# Patient Record
Sex: FEMALE | NOT HISPANIC OR LATINO | Employment: UNEMPLOYED | URBAN - METROPOLITAN AREA
[De-identification: names, ages, dates, MRNs, and addresses within clinical notes are randomized per-mention and may not be internally consistent; named-entity substitution may affect disease eponyms.]

---

## 2020-07-20 PROBLEM — F31.9 BIPOLAR 1 DISORDER (HCC): Status: ACTIVE | Noted: 2020-07-20

## 2021-04-28 ENCOUNTER — APPOINTMENT (OUTPATIENT)
Dept: GASTROENTEROLOGY | Age: 36
End: 2021-04-28
Attending: INTERNAL MEDICINE

## 2021-05-04 DIAGNOSIS — Z01.812 PRE-PROCEDURAL LABORATORY EXAMINATION: Primary | ICD-10-CM

## 2021-05-16 ENCOUNTER — LAB SERVICES (OUTPATIENT)
Dept: LAB | Age: 36
End: 2021-05-16

## 2021-05-16 DIAGNOSIS — Z01.812 PRE-PROCEDURAL LABORATORY EXAMINATION: ICD-10-CM

## 2021-05-16 LAB
SARS-COV-2 RNA RESP QL NAA+PROBE: NOT DETECTED
SERVICE CMNT-IMP: NORMAL
SERVICE CMNT-IMP: NORMAL

## 2021-05-16 PROCEDURE — U0003 INFECTIOUS AGENT DETECTION BY NUCLEIC ACID (DNA OR RNA); SEVERE ACUTE RESPIRATORY SYNDROME CORONAVIRUS 2 (SARS-COV-2) (CORONAVIRUS DISEASE [COVID-19]), AMPLIFIED PROBE TECHNIQUE, MAKING USE OF HIGH THROUGHPUT TECHNOLOGIES AS DESCRIBED BY CMS-2020-01-R: HCPCS | Performed by: INTERNAL MEDICINE

## 2021-05-16 PROCEDURE — U0005 INFEC AGEN DETEC AMPLI PROBE: HCPCS | Performed by: INTERNAL MEDICINE

## 2021-05-19 ENCOUNTER — ANESTHESIA (OUTPATIENT)
Dept: GASTROENTEROLOGY | Age: 36
End: 2021-05-19

## 2021-05-19 ENCOUNTER — ANESTHESIA EVENT (OUTPATIENT)
Dept: GASTROENTEROLOGY | Age: 36
End: 2021-05-19

## 2021-05-19 ENCOUNTER — HOSPITAL ENCOUNTER (OUTPATIENT)
Dept: GENERAL RADIOLOGY | Age: 36
Discharge: HOME | End: 2021-05-19
Attending: INTERNAL MEDICINE

## 2021-05-19 ENCOUNTER — HOSPITAL ENCOUNTER (OUTPATIENT)
Dept: GASTROENTEROLOGY | Age: 36
Discharge: HOME | End: 2021-05-19
Attending: INTERNAL MEDICINE

## 2021-05-19 VITALS
RESPIRATION RATE: 12 BRPM | OXYGEN SATURATION: 100 % | BODY MASS INDEX: 46.91 KG/M2 | DIASTOLIC BLOOD PRESSURE: 79 MMHG | HEART RATE: 69 BPM | TEMPERATURE: 97.5 F | WEIGHT: 264.77 LBS | SYSTOLIC BLOOD PRESSURE: 141 MMHG | HEIGHT: 63 IN

## 2021-05-19 DIAGNOSIS — R11.2 N&V (NAUSEA AND VOMITING): ICD-10-CM

## 2021-05-19 DIAGNOSIS — K83.8 BILIARY SLUDGE: ICD-10-CM

## 2021-05-19 DIAGNOSIS — R10.13 EPIGASTRIC PAIN: ICD-10-CM

## 2021-05-19 DIAGNOSIS — R52 PAIN: ICD-10-CM

## 2021-05-19 LAB
B-HCG UR QL: NEGATIVE
INTERNAL PROCEDURAL CONTROLS ACCEPTABLE: YES

## 2021-05-19 PROCEDURE — 81025 URINE PREGNANCY TEST: CPT | Performed by: INTERNAL MEDICINE

## 2021-05-19 PROCEDURE — 10002807 HB RX 258: Performed by: INTERNAL MEDICINE

## 2021-05-19 PROCEDURE — 10004451 HB PACU RECOVERY 1ST 30 MINUTES

## 2021-05-19 PROCEDURE — 13000008 HB ANESTHESIA MAC OUTSIDE OR

## 2021-05-19 PROCEDURE — 10002805 HB CONTRAST AGENT: Performed by: INTERNAL MEDICINE

## 2021-05-19 PROCEDURE — 13000001 HB PHASE II RECOVERY EA 30 MINUTES

## 2021-05-19 PROCEDURE — 88305 TISSUE EXAM BY PATHOLOGIST: CPT | Performed by: INTERNAL MEDICINE

## 2021-05-19 PROCEDURE — C1769 GUIDE WIRE: HCPCS

## 2021-05-19 PROCEDURE — 10002800 HB RX 250 W HCPCS

## 2021-05-19 PROCEDURE — 10006023 HB SUPPLY 272

## 2021-05-19 PROCEDURE — 13000028 HB GI MAJOR COMPLEX CASE S/U + 1ST 15 MIN

## 2021-05-19 PROCEDURE — 74330 X-RAY BILE/PANC ENDOSCOPY: CPT

## 2021-05-19 RX ORDER — MIDAZOLAM HYDROCHLORIDE 1 MG/ML
INJECTION, SOLUTION INTRAMUSCULAR; INTRAVENOUS PRN
Status: DISCONTINUED | OUTPATIENT
Start: 2021-05-19 | End: 2021-05-19

## 2021-05-19 RX ORDER — PREGABALIN 75 MG/1
75 CAPSULE ORAL
COMMUNITY
Start: 2020-05-20

## 2021-05-19 RX ORDER — ALBUTEROL SULFATE 90 UG/1
AEROSOL, METERED RESPIRATORY (INHALATION)
COMMUNITY
Start: 2021-04-19

## 2021-05-19 RX ORDER — FREMANEZUMAB-VFRM 225 MG/1.5ML
INJECTION SUBCUTANEOUS
COMMUNITY
Start: 2021-03-17

## 2021-05-19 RX ORDER — PANTOPRAZOLE SODIUM 40 MG/1
40 TABLET, DELAYED RELEASE ORAL
COMMUNITY
Start: 2017-09-21

## 2021-05-19 RX ORDER — APIXABAN 5 MG/1
TABLET, FILM COATED ORAL
COMMUNITY
Start: 2021-05-06

## 2021-05-19 RX ORDER — LOSARTAN POTASSIUM 100 MG/1
100 TABLET ORAL
COMMUNITY
Start: 2021-03-17

## 2021-05-19 RX ORDER — TIZANIDINE 4 MG/1
TABLET ORAL
COMMUNITY
Start: 2021-05-06

## 2021-05-19 RX ORDER — SODIUM CHLORIDE, SODIUM LACTATE, POTASSIUM CHLORIDE, CALCIUM CHLORIDE 600; 310; 30; 20 MG/100ML; MG/100ML; MG/100ML; MG/100ML
INJECTION, SOLUTION INTRAVENOUS CONTINUOUS
Status: DISCONTINUED | OUTPATIENT
Start: 2021-05-19 | End: 2021-05-21 | Stop reason: HOSPADM

## 2021-05-19 RX ORDER — TRAZODONE HYDROCHLORIDE 100 MG/1
300 TABLET ORAL
COMMUNITY
Start: 2021-03-17

## 2021-05-19 RX ORDER — METOCLOPRAMIDE 10 MG/1
TABLET ORAL
COMMUNITY
Start: 2021-04-05

## 2021-05-19 RX ORDER — PROPOFOL 10 MG/ML
INJECTION, EMULSION INTRAVENOUS PRN
Status: DISCONTINUED | OUTPATIENT
Start: 2021-05-19 | End: 2021-05-19

## 2021-05-19 RX ORDER — ATORVASTATIN CALCIUM 10 MG/1
10 TABLET, FILM COATED ORAL
COMMUNITY
Start: 2021-03-17

## 2021-05-19 RX ADMIN — FENTANYL CITRATE 50 MCG: 50 INJECTION, SOLUTION INTRAMUSCULAR; INTRAVENOUS at 09:35

## 2021-05-19 RX ADMIN — PROPOFOL 100 MG: 10 INJECTION, EMULSION INTRAVENOUS at 09:26

## 2021-05-19 RX ADMIN — MIDAZOLAM HYDROCHLORIDE 2 MG: 1 INJECTION, SOLUTION INTRAMUSCULAR; INTRAVENOUS at 09:23

## 2021-05-19 RX ADMIN — SODIUM CHLORIDE, SODIUM LACTATE, POTASSIUM CHLORIDE, AND CALCIUM CHLORIDE: .6; .31; .03; .02 INJECTION, SOLUTION INTRAVENOUS at 09:03

## 2021-05-19 RX ADMIN — PROPOFOL 50 MG: 10 INJECTION, EMULSION INTRAVENOUS at 09:31

## 2021-05-19 RX ADMIN — PROPOFOL 75 MCG/KG/MIN: 10 INJECTION, EMULSION INTRAVENOUS at 09:26

## 2021-05-19 RX ADMIN — IOHEXOL 50 ML: 300 INJECTION, SOLUTION INTRAVENOUS at 09:56

## 2021-05-19 RX ADMIN — PROPOFOL 50 MG: 10 INJECTION, EMULSION INTRAVENOUS at 09:36

## 2021-05-19 RX ADMIN — FENTANYL CITRATE 50 MCG: 50 INJECTION, SOLUTION INTRAMUSCULAR; INTRAVENOUS at 09:30

## 2021-05-19 ASSESSMENT — PAIN SCALES - GENERAL
PAINLEVEL_OUTOF10: 0
PAINLEVEL_OUTOF10: 0
PAINLEVEL_OUTOF10: 4
PAINLEVEL_OUTOF10: 4

## 2021-05-19 ASSESSMENT — ACTIVITIES OF DAILY LIVING (ADL): HISTORY OF FALLING IN THE LAST YEAR (PRIOR TO ADMISSION): YES

## 2021-05-21 LAB
ASR DISCLAIMER: NORMAL
CASE RPRT: NORMAL
CLINICAL INFO: NORMAL
PATH REPORT.FINAL DX SPEC: NORMAL
PATH REPORT.GROSS SPEC: NORMAL

## 2022-04-20 ENCOUNTER — APPOINTMENT (OUTPATIENT)
Dept: GENERAL RADIOLOGY | Facility: HOSPITAL | Age: 37
End: 2022-04-20
Attending: EMERGENCY MEDICINE
Payer: MEDICAID

## 2022-04-20 ENCOUNTER — HOSPITAL ENCOUNTER (EMERGENCY)
Facility: HOSPITAL | Age: 37
Discharge: HOME | End: 2022-04-20
Attending: EMERGENCY MEDICINE
Payer: MEDICAID

## 2022-04-20 VITALS
HEART RATE: 71 BPM | TEMPERATURE: 97 F | RESPIRATION RATE: 20 BRPM | DIASTOLIC BLOOD PRESSURE: 76 MMHG | SYSTOLIC BLOOD PRESSURE: 137 MMHG | OXYGEN SATURATION: 98 %

## 2022-04-20 DIAGNOSIS — G43.909 MIGRAINE WITHOUT STATUS MIGRAINOSUS, NOT INTRACTABLE, UNSPECIFIED MIGRAINE TYPE: ICD-10-CM

## 2022-04-20 DIAGNOSIS — M79.671 FOOT PAIN, BILATERAL: ICD-10-CM

## 2022-04-20 DIAGNOSIS — M79.672 FOOT PAIN, BILATERAL: ICD-10-CM

## 2022-04-20 DIAGNOSIS — M79.89 FOOT SWELLING: Primary | ICD-10-CM

## 2022-04-20 LAB
ALBUMIN SERPL-MCNC: 3.4 G/DL (ref 3.4–5)
ALBUMIN/GLOB SERPL: 0.9 {RATIO} (ref 1–2)
ALP LIVER SERPL-CCNC: 126 U/L
ALT SERPL-CCNC: 16 U/L
ANION GAP SERPL CALC-SCNC: 6 MMOL/L (ref 0–18)
AST SERPL-CCNC: 12 U/L (ref 15–37)
BASOPHILS # BLD AUTO: 0.05 X10(3) UL (ref 0–0.2)
BASOPHILS NFR BLD AUTO: 0.9 %
BILIRUB SERPL-MCNC: 0.2 MG/DL (ref 0.1–2)
BUN BLD-MCNC: 4 MG/DL (ref 7–18)
CALCIUM BLD-MCNC: 8.8 MG/DL (ref 8.5–10.1)
CHLORIDE SERPL-SCNC: 105 MMOL/L (ref 98–112)
CO2 SERPL-SCNC: 28 MMOL/L (ref 21–32)
CREAT BLD-MCNC: 0.68 MG/DL
EOSINOPHIL # BLD AUTO: 0.09 X10(3) UL (ref 0–0.7)
EOSINOPHIL NFR BLD AUTO: 1.7 %
ERYTHROCYTE [DISTWIDTH] IN BLOOD BY AUTOMATED COUNT: 18.1 %
GLOBULIN PLAS-MCNC: 3.8 G/DL (ref 2.8–4.4)
GLUCOSE BLD-MCNC: 91 MG/DL (ref 70–99)
HCT VFR BLD AUTO: 32.4 %
HGB BLD-MCNC: 10.1 G/DL
IMM GRANULOCYTES # BLD AUTO: 0.02 X10(3) UL (ref 0–1)
IMM GRANULOCYTES NFR BLD: 0.4 %
LYMPHOCYTES # BLD AUTO: 1.75 X10(3) UL (ref 1–4)
LYMPHOCYTES NFR BLD AUTO: 32.1 %
MCH RBC QN AUTO: 26.8 PG (ref 26–34)
MCHC RBC AUTO-ENTMCNC: 31.2 G/DL (ref 31–37)
MCV RBC AUTO: 85.9 FL
MONOCYTES # BLD AUTO: 0.49 X10(3) UL (ref 0.1–1)
MONOCYTES NFR BLD AUTO: 9 %
NEUTROPHILS # BLD AUTO: 3.05 X10 (3) UL (ref 1.5–7.7)
NEUTROPHILS # BLD AUTO: 3.05 X10(3) UL (ref 1.5–7.7)
NEUTROPHILS NFR BLD AUTO: 55.9 %
NT-PROBNP SERPL-MCNC: 256 PG/ML (ref ?–125)
OSMOLALITY SERPL CALC.SUM OF ELEC: 284 MOSM/KG (ref 275–295)
PLATELET # BLD AUTO: 521 10(3)UL (ref 150–450)
POTASSIUM SERPL-SCNC: 3.7 MMOL/L (ref 3.5–5.1)
PROT SERPL-MCNC: 7.2 G/DL (ref 6.4–8.2)
RBC # BLD AUTO: 3.77 X10(6)UL
SARS-COV-2 RNA RESP QL NAA+PROBE: NOT DETECTED
SODIUM SERPL-SCNC: 139 MMOL/L (ref 136–145)
TROPONIN I HIGH SENSITIVITY: 3 NG/L
WBC # BLD AUTO: 5.5 X10(3) UL (ref 4–11)

## 2022-04-20 PROCEDURE — 96376 TX/PRO/DX INJ SAME DRUG ADON: CPT

## 2022-04-20 PROCEDURE — 84484 ASSAY OF TROPONIN QUANT: CPT | Performed by: EMERGENCY MEDICINE

## 2022-04-20 PROCEDURE — 85025 COMPLETE CBC W/AUTO DIFF WBC: CPT | Performed by: EMERGENCY MEDICINE

## 2022-04-20 PROCEDURE — 71045 X-RAY EXAM CHEST 1 VIEW: CPT | Performed by: EMERGENCY MEDICINE

## 2022-04-20 PROCEDURE — 99285 EMERGENCY DEPT VISIT HI MDM: CPT

## 2022-04-20 PROCEDURE — 83880 ASSAY OF NATRIURETIC PEPTIDE: CPT | Performed by: EMERGENCY MEDICINE

## 2022-04-20 PROCEDURE — 93010 ELECTROCARDIOGRAM REPORT: CPT

## 2022-04-20 PROCEDURE — 96374 THER/PROPH/DIAG INJ IV PUSH: CPT

## 2022-04-20 PROCEDURE — 80053 COMPREHEN METABOLIC PANEL: CPT | Performed by: EMERGENCY MEDICINE

## 2022-04-20 PROCEDURE — 96375 TX/PRO/DX INJ NEW DRUG ADDON: CPT

## 2022-04-20 PROCEDURE — 73630 X-RAY EXAM OF FOOT: CPT | Performed by: EMERGENCY MEDICINE

## 2022-04-20 PROCEDURE — 93005 ELECTROCARDIOGRAM TRACING: CPT

## 2022-04-20 PROCEDURE — C9113 INJ PANTOPRAZOLE SODIUM, VIA: HCPCS | Performed by: EMERGENCY MEDICINE

## 2022-04-20 RX ORDER — HYDROMORPHONE HYDROCHLORIDE 1 MG/ML
0.5 INJECTION, SOLUTION INTRAMUSCULAR; INTRAVENOUS; SUBCUTANEOUS EVERY 30 MIN PRN
Status: DISCONTINUED | OUTPATIENT
Start: 2022-04-20 | End: 2022-04-20

## 2022-04-20 RX ORDER — METOCLOPRAMIDE HYDROCHLORIDE 5 MG/ML
10 INJECTION INTRAMUSCULAR; INTRAVENOUS ONCE
Status: COMPLETED | OUTPATIENT
Start: 2022-04-20 | End: 2022-04-20

## 2022-04-20 RX ORDER — DIPHENHYDRAMINE HYDROCHLORIDE 50 MG/ML
50 INJECTION INTRAMUSCULAR; INTRAVENOUS ONCE
Status: COMPLETED | OUTPATIENT
Start: 2022-04-20 | End: 2022-04-20

## 2022-04-20 RX ORDER — HYDROCODONE BITARTRATE AND ACETAMINOPHEN 5; 325 MG/1; MG/1
1-2 TABLET ORAL EVERY 6 HOURS PRN
Qty: 10 TABLET | Refills: 0 | Status: SHIPPED | OUTPATIENT
Start: 2022-04-20 | End: 2022-04-25

## 2022-04-20 RX ORDER — HYDROMORPHONE HYDROCHLORIDE 1 MG/ML
0.5 INJECTION, SOLUTION INTRAMUSCULAR; INTRAVENOUS; SUBCUTANEOUS ONCE
Status: COMPLETED | OUTPATIENT
Start: 2022-04-20 | End: 2022-04-20

## 2022-04-20 NOTE — ED INITIAL ASSESSMENT (HPI)
Pt to the emergency room for multiple complaints. Per pt she has been experiencing bilateral foot pain and swelling for the past month. Pt states she had a surgery to the left foot a few years ago, no hx of injury to the right foot. Pt reports increased pain with ambulation and weight bearing. Pt adds that 1hr pta she began having a migraine she took otc migraine medication with no improvement.

## 2022-04-21 ENCOUNTER — HOSPITAL ENCOUNTER (OUTPATIENT)
Dept: GENERAL RADIOLOGY | Age: 37
Discharge: HOME | End: 2022-04-21
Attending: PODIATRIST
Payer: MEDICAID

## 2022-04-21 ENCOUNTER — TELEPHONE (OUTPATIENT)
Dept: ORTHOPEDICS CLINIC | Facility: CLINIC | Age: 37
End: 2022-04-21

## 2022-04-21 DIAGNOSIS — M79.672 BILATERAL FOOT PAIN: ICD-10-CM

## 2022-04-21 DIAGNOSIS — M79.671 BILATERAL FOOT PAIN: ICD-10-CM

## 2022-04-21 LAB
ATRIAL RATE: 64 BPM
P AXIS: -6 DEGREES
P-R INTERVAL: 144 MS
Q-T INTERVAL: 436 MS
QRS DURATION: 82 MS
QTC CALCULATION (BEZET): 449 MS
R AXIS: 13 DEGREES
T AXIS: 28 DEGREES
VENTRICULAR RATE: 64 BPM

## 2022-04-21 PROCEDURE — 73630 X-RAY EXAM OF FOOT: CPT | Performed by: PODIATRIST

## 2022-04-21 NOTE — TELEPHONE ENCOUNTER
Patient is scheduled with Dr. Pastora Flynn for bilateral foot pain. Please advise if imaging is needed.

## 2022-04-21 NOTE — TELEPHONE ENCOUNTER
Imaging done yesterday but not weight-bearing (standing). Will need weight-bearing images, bilateral. Ordered. Please reach out to pt to schedule BOTH orders. Thank you!

## 2022-04-21 NOTE — TELEPHONE ENCOUNTER
Future Appointments   Date Time Provider Babs Monica   4/21/2022 11:45 AM [facility] XR RM1 [facility] X[facility]   4/22/2022 10:00 AM Jonathan Ma PA [facility] [place]     [facility] and advised to have both feet done with weightbearing.

## 2022-04-22 ENCOUNTER — OFFICE VISIT (OUTPATIENT)
Dept: PAIN CLINIC | Facility: CLINIC | Age: 37
End: 2022-04-22
Payer: MEDICAID

## 2022-04-22 VITALS
DIASTOLIC BLOOD PRESSURE: 82 MMHG | WEIGHT: 286 LBS | BODY MASS INDEX: 52 KG/M2 | OXYGEN SATURATION: 98 % | SYSTOLIC BLOOD PRESSURE: 122 MMHG | HEART RATE: 78 BPM

## 2022-04-22 DIAGNOSIS — F11.90 OPIATE USE: ICD-10-CM

## 2022-04-22 DIAGNOSIS — G89.29 OTHER CHRONIC PAIN: Primary | ICD-10-CM

## 2022-04-22 PROCEDURE — 96127 BRIEF EMOTIONAL/BEHAV ASSMT: CPT | Performed by: PHYSICIAN ASSISTANT

## 2022-04-22 PROCEDURE — 99205 OFFICE O/P NEW HI 60 MIN: CPT | Performed by: PHYSICIAN ASSISTANT

## 2022-04-22 PROCEDURE — 3074F SYST BP LT 130 MM HG: CPT | Performed by: PHYSICIAN ASSISTANT

## 2022-04-22 PROCEDURE — 3079F DIAST BP 80-89 MM HG: CPT | Performed by: PHYSICIAN ASSISTANT

## 2022-04-22 RX ORDER — BUPROPION HYDROCHLORIDE 150 MG/1
150 TABLET, EXTENDED RELEASE ORAL EVERY MORNING
COMMUNITY
Start: 2022-01-11

## 2022-04-22 RX ORDER — TIZANIDINE 4 MG/1
4 TABLET ORAL 3 TIMES DAILY PRN
COMMUNITY
Start: 2022-03-04

## 2022-04-22 RX ORDER — DULOXETIN HYDROCHLORIDE 60 MG/1
CAPSULE, DELAYED RELEASE ORAL
COMMUNITY
Start: 2022-03-02

## 2022-04-26 ENCOUNTER — OFFICE VISIT (OUTPATIENT)
Dept: PAIN CLINIC | Facility: CLINIC | Age: 37
End: 2022-04-26
Payer: MEDICAID

## 2022-04-26 ENCOUNTER — TELEPHONE (OUTPATIENT)
Dept: NEUROLOGY | Facility: CLINIC | Age: 37
End: 2022-04-26

## 2022-04-26 VITALS — BODY MASS INDEX: 52 KG/M2 | WEIGHT: 286 LBS

## 2022-04-26 DIAGNOSIS — G89.29 OTHER CHRONIC PAIN: ICD-10-CM

## 2022-04-26 DIAGNOSIS — M46.1 SACROILIITIS (HCC): Primary | ICD-10-CM

## 2022-04-26 PROCEDURE — 99214 OFFICE O/P EST MOD 30 MIN: CPT | Performed by: PHYSICIAN ASSISTANT

## 2022-04-26 RX ORDER — ACETAMINOPHEN AND CODEINE PHOSPHATE 300; 30 MG/1; MG/1
1 TABLET ORAL EVERY 4 HOURS PRN
COMMUNITY

## 2022-04-26 RX ORDER — ZIPRASIDONE HYDROCHLORIDE 40 MG/1
CAPSULE ORAL
COMMUNITY
Start: 2021-10-21

## 2022-04-26 NOTE — TELEPHONE ENCOUNTER
Prior authorization request completed for: Bilateral SI joint injection   Authorization # [id number]  Authorization dates: 4/26/2022 - 6/25/2022  CPT codes approved: 13411  Number of visits/dates of service approved: 1  Physician: Dr. Kennedi Ayala   Location: [address] [place] [address]      Patient can be scheduled. Routed to Navigator.

## 2022-04-26 NOTE — PROGRESS NOTES
Patient presents in office today with reported pain in lumbar spine, chronic pain    Current pain level reported = 9/10    Last reported dose of tylenol 3 at 2 AM      Narcotic Contract renewal na    Urine Drug screen na

## 2022-04-27 ENCOUNTER — TELEPHONE (OUTPATIENT)
Dept: PAIN CLINIC | Facility: CLINIC | Age: 37
End: 2022-04-27

## 2022-04-27 NOTE — TELEPHONE ENCOUNTER
Faxed med clearance to Dr. Call[facility] office at [phone]. Confirmation rec'd. Unable to route via Epic.

## 2022-04-27 NOTE — TELEPHONE ENCOUNTER
22  RE: Chadd Oliveros    : 10/9/1985    Dear Dr. Vitaly Rick    Your patient is being scheduled for a pain management procedure at [facility] within the next 4 weeks. Procedure: Bilateral SI joint injections  Physician: Dr. Sugar Avila- Anesthesiologist     Your patient is currently taking Eliquis. Dr. Sugar Avila usually recommends the medication be held for 3 days prior to injection. Please verify patient is cleared to proceed with pain management procedures.       Clearance Approved   ____________    Clearance Denied       ____________

## 2022-05-03 ENCOUNTER — HOSPITAL ENCOUNTER (EMERGENCY)
Facility: HOSPITAL | Age: 37
Discharge: HOME | End: 2022-05-03
Attending: EMERGENCY MEDICINE
Payer: MEDICAID

## 2022-05-03 ENCOUNTER — APPOINTMENT (OUTPATIENT)
Dept: GENERAL RADIOLOGY | Facility: HOSPITAL | Age: 37
End: 2022-05-03
Attending: EMERGENCY MEDICINE
Payer: MEDICAID

## 2022-05-03 VITALS
OXYGEN SATURATION: 98 % | HEIGHT: 63 IN | TEMPERATURE: 98 F | DIASTOLIC BLOOD PRESSURE: 96 MMHG | WEIGHT: 286 LBS | SYSTOLIC BLOOD PRESSURE: 138 MMHG | BODY MASS INDEX: 50.68 KG/M2 | RESPIRATION RATE: 18 BRPM | HEART RATE: 98 BPM

## 2022-05-03 DIAGNOSIS — S39.012A LOW BACK STRAIN, INITIAL ENCOUNTER: Primary | ICD-10-CM

## 2022-05-03 PROCEDURE — 99283 EMERGENCY DEPT VISIT LOW MDM: CPT

## 2022-05-03 PROCEDURE — 72110 X-RAY EXAM L-2 SPINE 4/>VWS: CPT | Performed by: EMERGENCY MEDICINE

## 2022-05-03 RX ORDER — METHYLPREDNISOLONE 4 MG/1
TABLET ORAL
Qty: 21 EACH | Refills: 0 | Status: SHIPPED | OUTPATIENT
Start: 2022-05-03

## 2022-05-03 RX ORDER — CYCLOBENZAPRINE HCL 10 MG
10 TABLET ORAL 3 TIMES DAILY PRN
Qty: 20 TABLET | Refills: 0 | Status: SHIPPED | OUTPATIENT
Start: 2022-05-03 | End: 2022-05-10

## 2022-05-03 RX ORDER — TRAMADOL HYDROCHLORIDE 50 MG/1
TABLET ORAL EVERY 6 HOURS PRN
Qty: 10 TABLET | Refills: 0 | Status: SHIPPED | OUTPATIENT
Start: 2022-05-03 | End: 2022-05-03

## 2022-05-03 RX ORDER — CYCLOBENZAPRINE HCL 10 MG
10 TABLET ORAL 3 TIMES DAILY PRN
Qty: 20 TABLET | Refills: 0 | Status: SHIPPED | OUTPATIENT
Start: 2022-05-03 | End: 2022-05-03

## 2022-05-03 RX ORDER — TRAMADOL HYDROCHLORIDE 50 MG/1
TABLET ORAL EVERY 6 HOURS PRN
Qty: 10 TABLET | Refills: 0 | Status: SHIPPED | OUTPATIENT
Start: 2022-05-03 | End: 2022-05-08

## 2022-05-03 RX ORDER — TRAMADOL HYDROCHLORIDE 50 MG/1
50 TABLET ORAL ONCE
Status: COMPLETED | OUTPATIENT
Start: 2022-05-03 | End: 2022-05-03

## 2022-05-03 RX ORDER — METHYLPREDNISOLONE 4 MG/1
TABLET ORAL
Qty: 21 EACH | Refills: 0 | Status: SHIPPED | OUTPATIENT
Start: 2022-05-03 | End: 2022-05-03

## 2022-05-03 NOTE — ED INITIAL ASSESSMENT (HPI)
Pt to ED from home for lower back pain x3 days. PT states she did no strenuous activity other than walking. Pt took tylenol 3 at approximately 0530 with no relief. Pt denies any other complaints at this time.

## 2022-05-04 ENCOUNTER — HOSPITAL ENCOUNTER (OUTPATIENT)
Dept: GENERAL RADIOLOGY | Age: 37
Discharge: HOME | End: 2022-05-04
Attending: PODIATRIST
Payer: MEDICAID

## 2022-05-04 ENCOUNTER — ORDER TRANSCRIPTION (OUTPATIENT)
Dept: ADMINISTRATIVE | Facility: HOSPITAL | Age: 37
End: 2022-05-04

## 2022-05-04 ENCOUNTER — OFFICE VISIT (OUTPATIENT)
Dept: ORTHOPEDICS CLINIC | Facility: CLINIC | Age: 37
End: 2022-05-04
Payer: MEDICAID

## 2022-05-04 VITALS — WEIGHT: 286 LBS | HEIGHT: 63 IN | BODY MASS INDEX: 50.68 KG/M2

## 2022-05-04 DIAGNOSIS — E66.9 OBESITY (BMI 30-39.9): ICD-10-CM

## 2022-05-04 DIAGNOSIS — M25.471 BILATERAL SWELLING OF FEET AND ANKLES: ICD-10-CM

## 2022-05-04 DIAGNOSIS — G89.29 CHRONIC ANKLE PAIN, UNSPECIFIED LATERALITY: ICD-10-CM

## 2022-05-04 DIAGNOSIS — M25.474 BILATERAL SWELLING OF FEET AND ANKLES: ICD-10-CM

## 2022-05-04 DIAGNOSIS — Z96.9 PRESENCE OF RETAINED HARDWARE: ICD-10-CM

## 2022-05-04 DIAGNOSIS — M76.61 TENDONITIS, ACHILLES, RIGHT: ICD-10-CM

## 2022-05-04 DIAGNOSIS — M25.475 BILATERAL SWELLING OF FEET AND ANKLES: ICD-10-CM

## 2022-05-04 DIAGNOSIS — M25.472 BILATERAL SWELLING OF FEET AND ANKLES: ICD-10-CM

## 2022-05-04 DIAGNOSIS — F17.210 CIGARETTE SMOKER: ICD-10-CM

## 2022-05-04 DIAGNOSIS — M25.579 CHRONIC ANKLE PAIN, UNSPECIFIED LATERALITY: ICD-10-CM

## 2022-05-04 DIAGNOSIS — M21.619 BUNION: ICD-10-CM

## 2022-05-04 DIAGNOSIS — G89.29 CHRONIC ANKLE PAIN, UNSPECIFIED LATERALITY: Primary | ICD-10-CM

## 2022-05-04 DIAGNOSIS — M25.579 CHRONIC ANKLE PAIN, UNSPECIFIED LATERALITY: Primary | ICD-10-CM

## 2022-05-04 PROCEDURE — 73610 X-RAY EXAM OF ANKLE: CPT | Performed by: PODIATRIST

## 2022-05-04 PROCEDURE — 3008F BODY MASS INDEX DOCD: CPT | Performed by: PODIATRIST

## 2022-05-04 PROCEDURE — 99204 OFFICE O/P NEW MOD 45 MIN: CPT | Performed by: PODIATRIST

## 2022-05-04 RX ORDER — PREDNISONE 20 MG/1
TABLET ORAL
COMMUNITY
Start: 2022-03-23

## 2022-05-04 RX ORDER — DULOXETIN HYDROCHLORIDE 30 MG/1
CAPSULE, DELAYED RELEASE ORAL
COMMUNITY
Start: 2022-05-03

## 2022-05-09 ENCOUNTER — OFFICE VISIT (OUTPATIENT)
Dept: PAIN CLINIC | Facility: CLINIC | Age: 37
End: 2022-05-09
Payer: MEDICAID

## 2022-05-09 ENCOUNTER — TELEPHONE (OUTPATIENT)
Dept: PAIN CLINIC | Facility: CLINIC | Age: 37
End: 2022-05-09

## 2022-05-09 VITALS
OXYGEN SATURATION: 98 % | WEIGHT: 286 LBS | SYSTOLIC BLOOD PRESSURE: 118 MMHG | BODY MASS INDEX: 51 KG/M2 | HEART RATE: 80 BPM | DIASTOLIC BLOOD PRESSURE: 76 MMHG

## 2022-05-09 DIAGNOSIS — M46.1 SACROILIITIS (HCC): Primary | ICD-10-CM

## 2022-05-09 DIAGNOSIS — M47.816 LUMBAR SPONDYLOSIS: Primary | ICD-10-CM

## 2022-05-09 PROCEDURE — 3078F DIAST BP <80 MM HG: CPT | Performed by: PHYSICIAN ASSISTANT

## 2022-05-09 PROCEDURE — 3074F SYST BP LT 130 MM HG: CPT | Performed by: PHYSICIAN ASSISTANT

## 2022-05-09 PROCEDURE — 99214 OFFICE O/P EST MOD 30 MIN: CPT | Performed by: PHYSICIAN ASSISTANT

## 2022-05-09 RX ORDER — TAMSULOSIN HYDROCHLORIDE 0.4 MG/1
0.4 CAPSULE ORAL DAILY
COMMUNITY
Start: 2022-05-05

## 2022-05-09 NOTE — TELEPHONE ENCOUNTER
Pt saw Alexandre Hudson PA-C in office and he is recommending different injection, does not want to proceed with bilateral SIJI. Will create another TE and send med clearance with updated procedure.

## 2022-05-09 NOTE — PROGRESS NOTES
Patient presents in office today with reported pain in SI joint and other chronic pain    Current pain level reported = 8/10    Last reported dose of nothing      Narcotic Contract renewal none    Urine Drug screen none

## 2022-05-09 NOTE — TELEPHONE ENCOUNTER
Medical clearance faxed to 's office at [phone]. Confirmation rec'd. Unable to rte via inbasket.

## 2022-05-09 NOTE — TELEPHONE ENCOUNTER
Coreen Vega PA-C saw pt in office today, 5/9/22. Jaclyn Yeager has decided to order a different injection and does not want pt to proceed with bilateral SI joint injection.

## 2022-05-09 NOTE — TELEPHONE ENCOUNTER
22    RE: Smith Ping    : 10/9/1985    Dear Dr. Anabel Davidson,    Your patient is being scheduled for a pain management procedure at [facility].    Procedure:  Bilateral Facet Injections L5/S1  Date of Procedure: TBD  Physician: Miguel Ángel Shankar- Anesthesiologist     Your patient is currently taking Eliquis.  usually recommends the medication be held for 3 days prior to injection. Please verify patient is cleared to proceed with pain management procedures.       Clearance Approved   ____________    Clearance Denied       ____________

## 2022-05-10 ENCOUNTER — HOSPITAL ENCOUNTER (OUTPATIENT)
Dept: CV DIAGNOSTICS | Facility: HOSPITAL | Age: 37
Discharge: HOME | End: 2022-05-10
Attending: INTERNAL MEDICINE
Payer: MEDICAID

## 2022-05-10 ENCOUNTER — TELEPHONE (OUTPATIENT)
Dept: NEUROLOGY | Facility: CLINIC | Age: 37
End: 2022-05-10

## 2022-05-10 ENCOUNTER — HOSPITAL ENCOUNTER (OUTPATIENT)
Dept: LAB | Facility: HOSPITAL | Age: 37
Discharge: HOME | End: 2022-05-10
Attending: INTERNAL MEDICINE
Payer: MEDICAID

## 2022-05-10 DIAGNOSIS — R01.1 MURMUR, HEART: ICD-10-CM

## 2022-05-10 DIAGNOSIS — I10 HTN (HYPERTENSION): ICD-10-CM

## 2022-05-10 DIAGNOSIS — E78.5 HYPERLIPEMIA: ICD-10-CM

## 2022-05-10 LAB
CHOLEST SERPL-MCNC: 178 MG/DL (ref ?–200)
HDLC SERPL-MCNC: 48 MG/DL (ref 40–59)
LDLC SERPL CALC-MCNC: 109 MG/DL (ref ?–100)
NONHDLC SERPL-MCNC: 130 MG/DL (ref ?–130)
TRIGL SERPL-MCNC: 118 MG/DL (ref 30–149)
VLDLC SERPL CALC-MCNC: 20 MG/DL (ref 0–30)

## 2022-05-10 PROCEDURE — 93306 TTE W/DOPPLER COMPLETE: CPT | Performed by: INTERNAL MEDICINE

## 2022-05-10 PROCEDURE — 36415 COLL VENOUS BLD VENIPUNCTURE: CPT | Performed by: INTERNAL MEDICINE

## 2022-05-10 PROCEDURE — 80061 LIPID PANEL: CPT | Performed by: INTERNAL MEDICINE

## 2022-05-10 NOTE — TELEPHONE ENCOUNTER
Prior authorization request completed for: Bilateral L5-S1 facet injection   Authorization # [id number]  Authorization dates: 5/10/2022 - 7/9/2022  CPT codes approved: 83547   Number of visits/dates of service approved: 1  Physician: Dr. LIONEL DAVISON   Location: [facility]     Patient can be scheduled. Routed to Navigator.

## 2022-05-20 NOTE — TELEPHONE ENCOUNTER
Pt was denied clearance. She needs bridging therapy. Has to be scheduled then resend med clear with date.

## 2022-05-28 ENCOUNTER — LAB ENCOUNTER (OUTPATIENT)
Dept: LAB | Age: 37
End: 2022-05-28
Attending: ANESTHESIOLOGY
Payer: MEDICAID

## 2022-05-28 DIAGNOSIS — M46.1 SACROILIITIS (HCC): ICD-10-CM

## 2022-05-30 ENCOUNTER — HOSPITAL ENCOUNTER (OUTPATIENT)
Age: 37
Discharge: HOME | End: 2022-05-30
Attending: EMERGENCY MEDICINE
Payer: MEDICAID

## 2022-05-30 VITALS
RESPIRATION RATE: 20 BRPM | BODY MASS INDEX: 50.68 KG/M2 | SYSTOLIC BLOOD PRESSURE: 121 MMHG | OXYGEN SATURATION: 99 % | DIASTOLIC BLOOD PRESSURE: 84 MMHG | WEIGHT: 286 LBS | TEMPERATURE: 99 F | HEIGHT: 63 IN | HEART RATE: 89 BPM

## 2022-05-30 DIAGNOSIS — Z20.822 ENCOUNTER FOR LABORATORY TESTING FOR COVID-19 VIRUS: Primary | ICD-10-CM

## 2022-05-30 PROCEDURE — 99212 OFFICE O/P EST SF 10 MIN: CPT

## 2022-05-30 NOTE — ED INITIAL ASSESSMENT (HPI)
Pt presents today for covid testing. Pt states that she has a procedure on Wednesday and needs covid testing. Pt denies any symptoms.

## 2022-05-31 LAB — SARS-COV-2 RNA RESP QL NAA+PROBE: NOT DETECTED

## 2022-06-01 ENCOUNTER — APPOINTMENT (OUTPATIENT)
Dept: GENERAL RADIOLOGY | Facility: HOSPITAL | Age: 37
End: 2022-06-01
Attending: ANESTHESIOLOGY
Payer: MEDICAID

## 2022-06-01 ENCOUNTER — HOSPITAL ENCOUNTER (OUTPATIENT)
Facility: HOSPITAL | Age: 37
Setting detail: HOSPITAL OUTPATIENT SURGERY
Discharge: HOME | End: 2022-06-01
Attending: ANESTHESIOLOGY | Admitting: ANESTHESIOLOGY
Payer: MEDICAID

## 2022-06-01 VITALS
RESPIRATION RATE: 17 BRPM | SYSTOLIC BLOOD PRESSURE: 118 MMHG | HEART RATE: 81 BPM | OXYGEN SATURATION: 95 % | DIASTOLIC BLOOD PRESSURE: 89 MMHG | TEMPERATURE: 98 F

## 2022-06-01 DIAGNOSIS — M46.1 SACROILIITIS (HCC): ICD-10-CM

## 2022-06-01 PROCEDURE — 3E0U3BZ INTRODUCTION OF ANESTHETIC AGENT INTO JOINTS, PERCUTANEOUS APPROACH: ICD-10-PCS | Performed by: ANESTHESIOLOGY

## 2022-06-01 PROCEDURE — 3E0U33Z INTRODUCTION OF ANTI-INFLAMMATORY INTO JOINTS, PERCUTANEOUS APPROACH: ICD-10-PCS | Performed by: ANESTHESIOLOGY

## 2022-06-01 PROCEDURE — 64493 INJ PARAVERT F JNT L/S 1 LEV: CPT | Performed by: ANESTHESIOLOGY

## 2022-06-01 RX ORDER — MIDAZOLAM HYDROCHLORIDE 1 MG/ML
INJECTION INTRAMUSCULAR; INTRAVENOUS
Status: DISCONTINUED | OUTPATIENT
Start: 2022-06-01 | End: 2022-06-01

## 2022-06-01 RX ORDER — ENOXAPARIN SODIUM 100 MG/ML
100 INJECTION SUBCUTANEOUS 2 TIMES DAILY
COMMUNITY
Start: 2022-05-27

## 2022-06-01 RX ORDER — METHYLPREDNISOLONE ACETATE 40 MG/ML
INJECTION, SUSPENSION INTRA-ARTICULAR; INTRALESIONAL; INTRAMUSCULAR; SOFT TISSUE
Status: DISCONTINUED | OUTPATIENT
Start: 2022-06-01 | End: 2022-06-01

## 2022-06-01 RX ORDER — ONDANSETRON 2 MG/ML
4 INJECTION INTRAMUSCULAR; INTRAVENOUS ONCE AS NEEDED
Status: DISCONTINUED | OUTPATIENT
Start: 2022-06-01 | End: 2022-06-01

## 2022-06-01 RX ORDER — LIDOCAINE HYDROCHLORIDE 10 MG/ML
INJECTION, SOLUTION EPIDURAL; INFILTRATION; INTRACAUDAL; PERINEURAL
Status: DISCONTINUED | OUTPATIENT
Start: 2022-06-01 | End: 2022-06-01

## 2022-06-01 RX ORDER — DIPHENHYDRAMINE HYDROCHLORIDE 50 MG/ML
50 INJECTION INTRAMUSCULAR; INTRAVENOUS ONCE AS NEEDED
Status: DISCONTINUED | OUTPATIENT
Start: 2022-06-01 | End: 2022-06-01

## 2022-06-01 RX ORDER — SODIUM CHLORIDE, SODIUM LACTATE, POTASSIUM CHLORIDE, CALCIUM CHLORIDE 600; 310; 30; 20 MG/100ML; MG/100ML; MG/100ML; MG/100ML
100 INJECTION, SOLUTION INTRAVENOUS CONTINUOUS
Status: DISCONTINUED | OUTPATIENT
Start: 2022-06-01 | End: 2022-06-01

## 2022-06-01 NOTE — OPERATIVE REPORT
[facility]  Operative Report  2022     Elenita Perera Patient Status:  Hospital Outpatient Surgery    10/9/1985 MRN [id number]   Location [address] [address] Attending Diane Gillette MD   Hosp Day # 0 PCP Lobito Blanton     Indication: Fatuma Vicente is a 43year old female patient with chronic low back pain failed conservative treatment with medication and physical therapy was here for a bilateral diagnostic lumbar facet joint injection. Preoperative Diagnosis:  Sacroiliitis (HCC) [M46.1]    Postoperative Diagnosis: Same as above. Procedure performed: Bilateral diagnostic lumbar facet joint injection at L5-S1 level under fluoroscopy. Anesthesia: Local and IV Sedation. EBL: Less than 1 ml. Procedure Description:   After reviewing the patient's history and performing a focused physical examination, the diagnosis was confirmed and contraindications such as infection and coagulopathy were ruled out. Following review of potential side effects and complications, including but not necessarily limited to infection, allergic reaction, local tissue breakdown, nerve injury, and paresis, the patient indicated they understood and agreed to proceed. After obtaining the informed consent, the patient was brought to the procedure room and monitored. Per my order and under my supervision, the patient was sedated with intermittent intravenous doses of versed and fentanyl. The vital signs were monitored and recorded by an experienced RN. The procedure started after the patient was adequately sedated. The moderate intravenous conscious sedation was provided for 10 minutes. In the prone position, following sterile prep and drape of the lumbar region, the corresponding facet joints were identified under fluoroscopy. The skin was anesthetized via 25-gauge 1.5\" needle with approximately 2 mL of 1% lidocaine.   A 22-gauge 3.5\" Quincke spinal needle was introduced and advanced into the right L5-S1 level atraumatically under fluoroscopic guidance. Following negative aspiration for CSF and blood, approximately 1 mL of 1% lidocaine with 10 mg of methylprednisolone was injected into each joint without complication. The needle was withdrawn with stylet in situ after being flushed with 0.5 mL lidocaine. The contralateral lumbar facets joints were identified under fluoroscopy. A 22-gauge 3.5\" Quincke spinal needle was introduced and advanced into the left  L5-S1 level atraumatically under fluoroscopic guidance. Following negative aspiration for CSF and blood, approximately 1 mL of 1% lidocaine with 10 mg of methylprednisolone was injected into each joint without complication. The needle was withdrawn with stylet in situ after being flushed with 0.5 mL lidocaine. The patient tolerated procedure very well. The patient was observed until discharge criteria met. Discharge instructions were given and patient was released to a responsible adult. Complications: None. Follow up: The patient was followed in the pain clinic as needed basis.       Martín Salazar MD

## 2022-06-21 ENCOUNTER — APPOINTMENT (OUTPATIENT)
Dept: CT IMAGING | Facility: HOSPITAL | Age: 37
End: 2022-06-21
Attending: EMERGENCY MEDICINE
Payer: MEDICAID

## 2022-06-21 ENCOUNTER — HOSPITAL ENCOUNTER (EMERGENCY)
Facility: HOSPITAL | Age: 37
Discharge: HOME | End: 2022-06-21
Attending: EMERGENCY MEDICINE
Payer: MEDICAID

## 2022-06-21 VITALS
SYSTOLIC BLOOD PRESSURE: 134 MMHG | OXYGEN SATURATION: 94 % | HEART RATE: 75 BPM | RESPIRATION RATE: 16 BRPM | TEMPERATURE: 97 F | DIASTOLIC BLOOD PRESSURE: 81 MMHG

## 2022-06-21 DIAGNOSIS — E87.6 HYPOKALEMIA: ICD-10-CM

## 2022-06-21 DIAGNOSIS — N30.01 ACUTE CYSTITIS WITH HEMATURIA: Primary | ICD-10-CM

## 2022-06-21 DIAGNOSIS — R10.13 EPIGASTRIC PAIN: ICD-10-CM

## 2022-06-21 LAB
ALBUMIN SERPL-MCNC: 3.8 G/DL (ref 3.4–5)
ALBUMIN/GLOB SERPL: 0.8 {RATIO} (ref 1–2)
ALP LIVER SERPL-CCNC: 175 U/L
ALT SERPL-CCNC: 25 U/L
ANION GAP SERPL CALC-SCNC: 7 MMOL/L (ref 0–18)
AST SERPL-CCNC: 11 U/L (ref 15–37)
BASOPHILS # BLD AUTO: 0.05 X10(3) UL (ref 0–0.2)
BASOPHILS NFR BLD AUTO: 0.7 %
BILIRUB SERPL-MCNC: 0.5 MG/DL (ref 0.1–2)
BILIRUB UR QL STRIP.AUTO: NEGATIVE
BUN BLD-MCNC: 6 MG/DL (ref 7–18)
CALCIUM BLD-MCNC: 9.6 MG/DL (ref 8.5–10.1)
CHLORIDE SERPL-SCNC: 106 MMOL/L (ref 98–112)
CO2 SERPL-SCNC: 26 MMOL/L (ref 21–32)
COLOR UR AUTO: YELLOW
CREAT BLD-MCNC: 0.88 MG/DL
EOSINOPHIL # BLD AUTO: 0.09 X10(3) UL (ref 0–0.7)
EOSINOPHIL NFR BLD AUTO: 1.2 %
ERYTHROCYTE [DISTWIDTH] IN BLOOD BY AUTOMATED COUNT: 16.8 %
GLOBULIN PLAS-MCNC: 4.5 G/DL (ref 2.8–4.4)
GLUCOSE BLD-MCNC: 99 MG/DL (ref 70–99)
GLUCOSE UR STRIP.AUTO-MCNC: NEGATIVE MG/DL
HCT VFR BLD AUTO: 37.5 %
HGB BLD-MCNC: 11.6 G/DL
IMM GRANULOCYTES # BLD AUTO: 0.03 X10(3) UL (ref 0–1)
IMM GRANULOCYTES NFR BLD: 0.4 %
KETONES UR STRIP.AUTO-MCNC: NEGATIVE MG/DL
LIPASE SERPL-CCNC: 84 U/L (ref 73–393)
LYMPHOCYTES # BLD AUTO: 2.57 X10(3) UL (ref 1–4)
LYMPHOCYTES NFR BLD AUTO: 34.6 %
MCH RBC QN AUTO: 26.3 PG (ref 26–34)
MCHC RBC AUTO-ENTMCNC: 30.9 G/DL (ref 31–37)
MCV RBC AUTO: 85 FL
MONOCYTES # BLD AUTO: 0.48 X10(3) UL (ref 0.1–1)
MONOCYTES NFR BLD AUTO: 6.5 %
NEUTROPHILS # BLD AUTO: 4.2 X10 (3) UL (ref 1.5–7.7)
NEUTROPHILS # BLD AUTO: 4.2 X10(3) UL (ref 1.5–7.7)
NEUTROPHILS NFR BLD AUTO: 56.6 %
NITRITE UR QL STRIP.AUTO: NEGATIVE
OSMOLALITY SERPL CALC.SUM OF ELEC: 286 MOSM/KG (ref 275–295)
PH UR STRIP.AUTO: 5 [PH] (ref 5–8)
PLATELET # BLD AUTO: 438 10(3)UL (ref 150–450)
POTASSIUM SERPL-SCNC: 3.1 MMOL/L (ref 3.5–5.1)
PROT SERPL-MCNC: 8.3 G/DL (ref 6.4–8.2)
PROT UR STRIP.AUTO-MCNC: 30 MG/DL
RBC # BLD AUTO: 4.41 X10(6)UL
SODIUM SERPL-SCNC: 139 MMOL/L (ref 136–145)
SP GR UR STRIP.AUTO: >1.03 (ref 1–1.03)
UROBILINOGEN UR STRIP.AUTO-MCNC: <2 MG/DL
WBC # BLD AUTO: 7.4 X10(3) UL (ref 4–11)

## 2022-06-21 PROCEDURE — 81001 URINALYSIS AUTO W/SCOPE: CPT | Performed by: EMERGENCY MEDICINE

## 2022-06-21 PROCEDURE — 96374 THER/PROPH/DIAG INJ IV PUSH: CPT

## 2022-06-21 PROCEDURE — 87086 URINE CULTURE/COLONY COUNT: CPT | Performed by: EMERGENCY MEDICINE

## 2022-06-21 PROCEDURE — 96375 TX/PRO/DX INJ NEW DRUG ADDON: CPT

## 2022-06-21 PROCEDURE — 80053 COMPREHEN METABOLIC PANEL: CPT | Performed by: EMERGENCY MEDICINE

## 2022-06-21 PROCEDURE — 99284 EMERGENCY DEPT VISIT MOD MDM: CPT

## 2022-06-21 PROCEDURE — 74177 CT ABD & PELVIS W/CONTRAST: CPT | Performed by: EMERGENCY MEDICINE

## 2022-06-21 PROCEDURE — 83690 ASSAY OF LIPASE: CPT | Performed by: EMERGENCY MEDICINE

## 2022-06-21 PROCEDURE — 96361 HYDRATE IV INFUSION ADD-ON: CPT

## 2022-06-21 PROCEDURE — 85025 COMPLETE CBC W/AUTO DIFF WBC: CPT | Performed by: EMERGENCY MEDICINE

## 2022-06-21 PROCEDURE — 99285 EMERGENCY DEPT VISIT HI MDM: CPT

## 2022-06-21 RX ORDER — LIDOCAINE HYDROCHLORIDE 20 MG/ML
10 SOLUTION OROPHARYNGEAL ONCE
Status: COMPLETED | OUTPATIENT
Start: 2022-06-21 | End: 2022-06-21

## 2022-06-21 RX ORDER — NITROFURANTOIN 25; 75 MG/1; MG/1
100 CAPSULE ORAL 2 TIMES DAILY
Qty: 10 CAPSULE | Refills: 0 | Status: SHIPPED | OUTPATIENT
Start: 2022-06-21 | End: 2022-06-26

## 2022-06-21 RX ORDER — ONDANSETRON 2 MG/ML
4 INJECTION INTRAMUSCULAR; INTRAVENOUS ONCE
Status: COMPLETED | OUTPATIENT
Start: 2022-06-21 | End: 2022-06-21

## 2022-06-21 RX ORDER — MORPHINE SULFATE 4 MG/ML
4 INJECTION, SOLUTION INTRAMUSCULAR; INTRAVENOUS ONCE
Status: COMPLETED | OUTPATIENT
Start: 2022-06-21 | End: 2022-06-21

## 2022-06-21 RX ORDER — HYDROCODONE BITARTRATE AND ACETAMINOPHEN 5; 325 MG/1; MG/1
1-2 TABLET ORAL EVERY 6 HOURS PRN
Qty: 10 TABLET | Refills: 0 | Status: SHIPPED | OUTPATIENT
Start: 2022-06-21 | End: 2022-06-26

## 2022-06-21 RX ORDER — MAGNESIUM HYDROXIDE/ALUMINUM HYDROXICE/SIMETHICONE 120; 1200; 1200 MG/30ML; MG/30ML; MG/30ML
30 SUSPENSION ORAL ONCE
Status: COMPLETED | OUTPATIENT
Start: 2022-06-21 | End: 2022-06-21

## 2022-06-21 RX ORDER — HYDROCODONE BITARTRATE AND ACETAMINOPHEN 5; 325 MG/1; MG/1
1 TABLET ORAL ONCE
Status: COMPLETED | OUTPATIENT
Start: 2022-06-21 | End: 2022-06-21

## 2022-06-21 RX ORDER — POTASSIUM CHLORIDE 1.5 G/1.77G
20 POWDER, FOR SOLUTION ORAL DAILY
Qty: 5 PACKET | Refills: 0 | Status: SHIPPED | OUTPATIENT
Start: 2022-06-21 | End: 2022-06-26

## 2022-06-21 NOTE — ED INITIAL ASSESSMENT (HPI)
A&Ox3 patient p/w c/o abdominal pain    Endorses a few days of epigastric pain radiating to RUQ.     +nausea and vomiting, \"hurts when I take a deep breath in\"    Denies any cp/sob/d/c/f/LH/vision changes/urinary sx at this time  RR even/NL, speaking in full clear sentences, ambulatory w/ steady gait

## 2022-09-07 ENCOUNTER — OFFICE VISIT (OUTPATIENT)
Dept: PAIN CLINIC | Facility: CLINIC | Age: 37
End: 2022-09-07
Payer: MEDICAID

## 2022-09-07 VITALS — HEART RATE: 87 BPM | DIASTOLIC BLOOD PRESSURE: 80 MMHG | OXYGEN SATURATION: 98 % | SYSTOLIC BLOOD PRESSURE: 110 MMHG

## 2022-09-07 DIAGNOSIS — M54.16 LUMBAR RADICULITIS: ICD-10-CM

## 2022-09-07 DIAGNOSIS — M47.816 LUMBAR SPONDYLOSIS: Primary | ICD-10-CM

## 2022-09-07 PROCEDURE — 99214 OFFICE O/P EST MOD 30 MIN: CPT | Performed by: PHYSICIAN ASSISTANT

## 2022-09-07 PROCEDURE — 3079F DIAST BP 80-89 MM HG: CPT | Performed by: PHYSICIAN ASSISTANT

## 2022-09-07 PROCEDURE — 3074F SYST BP LT 130 MM HG: CPT | Performed by: PHYSICIAN ASSISTANT

## 2022-09-07 RX ORDER — WARFARIN SODIUM 7.5 MG/1
7.5 TABLET ORAL DAILY
COMMUNITY
Start: 2022-08-26 | End: 2022-09-07

## 2022-09-07 RX ORDER — WARFARIN SODIUM 5 MG/1
TABLET ORAL
COMMUNITY
Start: 2022-09-06

## 2022-09-07 RX ORDER — POTASSIUM CHLORIDE 1.5 G/1.77G
POWDER, FOR SOLUTION ORAL
COMMUNITY
End: 2022-09-07

## 2022-09-07 NOTE — PROGRESS NOTES
Patient presents in office today with reported pain in low back     Current pain level reported =8 /10    Last reported dose of nothing       Narcotic Contract renewal na    Urine Drug screen na

## 2023-05-02 ENCOUNTER — HOSPITAL ENCOUNTER (EMERGENCY)
Facility: HOSPITAL | Age: 38
Discharge: HOME | End: 2023-05-02
Attending: EMERGENCY MEDICINE
Payer: COMMERCIAL

## 2023-05-02 ENCOUNTER — TELEPHONE (OUTPATIENT)
Dept: ORTHOPEDICS CLINIC | Facility: CLINIC | Age: 38
End: 2023-05-02

## 2023-05-02 ENCOUNTER — APPOINTMENT (OUTPATIENT)
Dept: GENERAL RADIOLOGY | Facility: HOSPITAL | Age: 38
End: 2023-05-02
Attending: EMERGENCY MEDICINE
Payer: COMMERCIAL

## 2023-05-02 VITALS
OXYGEN SATURATION: 97 % | HEART RATE: 76 BPM | RESPIRATION RATE: 18 BRPM | DIASTOLIC BLOOD PRESSURE: 76 MMHG | SYSTOLIC BLOOD PRESSURE: 109 MMHG | TEMPERATURE: 98 F

## 2023-05-02 DIAGNOSIS — M25.572 ACUTE BILATERAL ANKLE PAIN: Primary | ICD-10-CM

## 2023-05-02 DIAGNOSIS — M54.16 LUMBAR RADICULOPATHY: Primary | ICD-10-CM

## 2023-05-02 DIAGNOSIS — M25.571 ACUTE BILATERAL ANKLE PAIN: Primary | ICD-10-CM

## 2023-05-02 PROCEDURE — 72110 X-RAY EXAM L-2 SPINE 4/>VWS: CPT | Performed by: EMERGENCY MEDICINE

## 2023-05-02 PROCEDURE — 99283 EMERGENCY DEPT VISIT LOW MDM: CPT

## 2023-05-02 PROCEDURE — 99284 EMERGENCY DEPT VISIT MOD MDM: CPT

## 2023-05-02 RX ORDER — CYCLOBENZAPRINE HCL 10 MG
10 TABLET ORAL 3 TIMES DAILY PRN
Qty: 20 TABLET | Refills: 0 | Status: SHIPPED | OUTPATIENT
Start: 2023-05-02 | End: 2023-05-09

## 2023-05-02 RX ORDER — METHYLPREDNISOLONE 4 MG/1
TABLET ORAL
Qty: 21 EACH | Refills: 0 | Status: SHIPPED | OUTPATIENT
Start: 2023-05-02

## 2023-05-02 RX ORDER — QUETIAPINE FUMARATE 100 MG/1
100 TABLET, FILM COATED ORAL EVERY MORNING
COMMUNITY

## 2023-05-02 RX ORDER — HYDROXYZINE HYDROCHLORIDE 25 MG/1
25 TABLET, FILM COATED ORAL EVERY 8 HOURS PRN
COMMUNITY

## 2023-05-02 RX ORDER — HYDROCODONE BITARTRATE AND ACETAMINOPHEN 5; 325 MG/1; MG/1
1 TABLET ORAL ONCE
Status: COMPLETED | OUTPATIENT
Start: 2023-05-02 | End: 2023-05-02

## 2023-05-02 RX ORDER — QUETIAPINE FUMARATE 200 MG/1
200 TABLET, FILM COATED ORAL NIGHTLY
COMMUNITY

## 2023-05-02 RX ORDER — HYDROCODONE BITARTRATE AND ACETAMINOPHEN 5; 325 MG/1; MG/1
1-2 TABLET ORAL EVERY 4 HOURS PRN
Qty: 20 TABLET | Refills: 0 | Status: SHIPPED | OUTPATIENT
Start: 2023-05-02 | End: 2023-05-09

## 2023-05-02 RX ORDER — DIAZEPAM 5 MG/1
5 TABLET ORAL ONCE
Status: COMPLETED | OUTPATIENT
Start: 2023-05-02 | End: 2023-05-02

## 2023-05-02 NOTE — TELEPHONE ENCOUNTER
Patient is scheduled with Dr. Dmitry Garcia for bilateral ankle pain. Please advise if imaging is needed.

## 2024-08-05 PROBLEM — C73 THYROID CANCER (HCC): Status: ACTIVE | Noted: 2024-08-05

## 2024-10-17 ENCOUNTER — OFFICE VISIT (OUTPATIENT)
Dept: PAIN CLINIC | Facility: CLINIC | Age: 39
End: 2024-10-17
Payer: COMMERCIAL

## 2024-10-17 VITALS — WEIGHT: 286 LBS | BODY MASS INDEX: 51 KG/M2

## 2024-10-17 DIAGNOSIS — Z79.891 CHRONIC PRESCRIPTION OPIATE USE: ICD-10-CM

## 2024-10-17 DIAGNOSIS — G89.29 OTHER CHRONIC PAIN: Primary | ICD-10-CM

## 2024-10-17 PROCEDURE — 99214 OFFICE O/P EST MOD 30 MIN: CPT | Performed by: PHYSICIAN ASSISTANT

## 2024-10-17 RX ORDER — GLIPIZIDE 10 MG/1
10 TABLET ORAL DAILY
COMMUNITY
Start: 2024-09-21

## 2024-10-17 RX ORDER — ONDANSETRON 4 MG/1
1 TABLET, ORALLY DISINTEGRATING ORAL EVERY 8 HOURS PRN
COMMUNITY

## 2024-10-17 RX ORDER — DOCUSATE SODIUM 100 MG/1
CAPSULE, LIQUID FILLED ORAL
COMMUNITY
Start: 2024-07-19

## 2024-10-17 RX ORDER — HYDROCHLOROTHIAZIDE 12.5 MG/1
TABLET ORAL
COMMUNITY

## 2024-10-17 RX ORDER — ZIPRASIDONE HYDROCHLORIDE 20 MG/1
20 CAPSULE ORAL 2 TIMES DAILY
COMMUNITY
Start: 2024-09-23

## 2024-10-17 RX ORDER — INSULIN GLARGINE-YFGN 100 [IU]/ML
20 INJECTION, SOLUTION SUBCUTANEOUS NIGHTLY
COMMUNITY

## 2024-10-17 RX ORDER — GABAPENTIN 400 MG/1
400 CAPSULE ORAL 3 TIMES DAILY
COMMUNITY
Start: 2024-09-20

## 2024-10-17 RX ORDER — ROSUVASTATIN CALCIUM 10 MG/1
1 TABLET, COATED ORAL DAILY
COMMUNITY
Start: 2024-06-24

## 2024-10-17 RX ORDER — PANTOPRAZOLE SODIUM 40 MG/1
40 TABLET, DELAYED RELEASE ORAL 2 TIMES DAILY
COMMUNITY
Start: 2024-06-07

## 2024-10-17 RX ORDER — LEVOTHYROXINE SODIUM 150 UG/1
1 TABLET ORAL
COMMUNITY
Start: 2024-09-29 | End: 2025-09-29

## 2024-10-17 RX ORDER — HYDROCODONE BITARTRATE AND ACETAMINOPHEN 10; 325 MG/1; MG/1
1 TABLET ORAL 2 TIMES DAILY
COMMUNITY
Start: 2024-09-20

## 2024-10-17 NOTE — PROGRESS NOTES
Patient presents in office today with reported pain in lumbar spine and bilateral hips    Current pain level reported = 8/10    Last reported dose of norco last night      Narcotic Contract renewal none    Urine Drug screen none

## 2024-10-17 NOTE — PROGRESS NOTES
HPI:   Jennifer Monique presents with complaints of diffuse pain complaints, today, here for low back pain radiating to R>L gluteal region and posterior thighs.    The pain is described as severe aching that is constant .  The patient’s activity level has remained the same since last visit.  The pain is worst unrelated to time of day.    Changes in condition/history since last visit: here for follow up having not been seen since 9/7/2022.  At that time, had reported no relief from B L5/S1 facet injections on 6/1/22.  Predating this, had already failed bilateral SI joint injections and L5-S1 DEB at an outside pain clinic.  She had an old MRI that was relatively benign.  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  Since that time, pain had significantly increased, and now states she has had bladder incontinence.  She was sent for an MRI, though does not appear as of this was conducted.  She had been sent to psych, having had previous suicide attempts, although never went.    Since that time, states that she has seen multiple pain clinics, most recently Dr. Rios, though no records available for review at this time.  States that she has had \"every injection you can name\" to include ablations, to no change in pain.  States that she was given ketamine treatments, which was initially helpful, though became ineffective.  At some point, she was placed back on opiates, and has been prescribed norco 10 mg BID.  She was diagnosed with thyroid CA, and had surgery on 7/18/24.  During her post op window was prescribed norco by her surgeon, though after this, was told to obtain medication from PCP.  She was told to follow with pain for refills, and came back here.      Last procedure: B L5/S1 facet injection    Date: 6/1/22    Percentage of relief experienced from the procedure: 0%    Duration of the relief: n/a    The following activities will increase the patient’s pain:  walking, standing, any prolonged activity    The following activities decrease the patient’s pain: limiting activity level    Functional Assessment: Patient reports that they are able to complete all of their ADL's such as eating, bathing, using the toilet, dressing and getting up from a bed or a chair independently.    Current Medications:  Current Outpatient Medications   Medication Sig Dispense Refill    cholecalciferol 1.25 MG (97623 UT) Oral Cap       docusate sodium 100 MG Oral Cap       gabapentin 400 MG Oral Cap Take 1 capsule (400 mg total) by mouth 3 (three) times daily.      glipiZIDE 10 MG Oral Tab Take 1 tablet (10 mg total) by mouth daily.      HYDROcodone-acetaminophen  MG Oral Tab Take 1 tablet by mouth 2 (two) times daily.      Insulin Glargine-yfgn 100 UNIT/ML Subcutaneous Solution Pen-injector Inject 20 Units into the skin nightly.      levothyroxine 150 MCG Oral Tab Take 1 tablet (150 mcg total) by mouth before breakfast.      metFORMIN HCl 1000 MG Oral Tab Take 1 tablet (1,000 mg total) by mouth 2 (two) times daily.      ondansetron 4 MG Oral Tablet Dispersible Place 1 tablet (4 mg total) onto the tongue every 8 (eight) hours as needed.      pantoprazole 40 MG Oral Tab EC Take 1 tablet (40 mg total) by mouth 2 (two) times daily.      rivaroxaban 20 MG Oral Tab Take 1 tablet (20 mg total) by mouth.      rosuvastatin 10 MG Oral Tab Take 1 tablet (10 mg total) by mouth daily.      tiZANidine 4 MG Oral Tab Take 1 tablet (4 mg total) by mouth 2 (two) times daily.      ziprasidone 20 MG Oral Cap Take 1 capsule (20 mg total) by mouth 2 (two) times daily.      losartan 100 MG Oral Tab Take 1 tablet (100 mg total) by mouth daily.      Omeprazole 40 MG Oral Capsule Delayed Release Take 1 capsule (40 mg total) by mouth before breakfast.      pregabalin 225 MG Oral Cap Take 1 capsule (225 mg total) by mouth 2 (two) times daily.      ALBUTEROL SULFATE ER OR Reported on 4/4/2017      hydroCHLOROthiazide  12.5 MG Oral Tab Take 1 tablet every other day by oral route.      hydrOXYzine 25 MG Oral Tab Take 1 tablet (25 mg total) by mouth every 8 (eight) hours as needed for Itching. (Patient not taking: Reported on 10/17/2024)      warfarin 5 MG Oral Tab  (Patient not taking: Reported on 10/17/2024)      DULoxetine 30 MG Oral Cap DR Particles  (Patient not taking: Reported on 10/17/2024)      DULoxetine 60 MG Oral Cap DR Particles  (Patient not taking: Reported on 10/17/2024)        Patient requires assistance with: household chores (laundry, dishes, vacuuming, etc), outdoor yard work (mowing lawn, trimming, raking, gardening, etc)    Reviewed Patient History Dated: 6/1/22 no changes noted    Physical Exam:   Wt 286 lb (129.7 kg)   BMI 50.66 kg/m²   VAS Pain Score:  8/10  General Appearance: Well developed, Well nourished, Independent body habitus, Well groomed, Obese    Neurological Exam: WNL-Orientation to time, place and person, normal mood & effect, normal concentration & attention span  Inspection: antalgic, no acute distress  Radiology/Lab Test Reviewed: MRI reviewed (report from 10/29/23, see care everywhere)   The L1-L2 level demonstrates no evidence of disc extrusion, spinal stenosis, or significant foraminal narrowing.     The L2-L3 level demonstrates no evidence of disc extrusion, spinal stenosis, or significant foraminal narrowing.     The L3-L4 level demonstrates no evidence of disc extrusion, spinal stenosis, or significant foraminal narrowing.     The L4-L5 level demonstrates no evidence of disc extrusion, spinal stenosis, or significant foraminal narrowing.     The L5-S1 level demonstrates there is minimal central disc protrusion without central canal or neural foraminal stenosis. Small posterior annular disc tear as seen previously.     Lab Results   Component Value Date    WBC 7.4 06/21/2022    WBC 5.5 04/20/2022    WBC 6.37 02/10/2022     Lab Results   Component Value Date    HEMOGLOBIN 11.8 05/27/2016      Lab Results   Component Value Date    .0 06/21/2022    .0 (H) 04/20/2022     (H) 02/10/2022     Do you have any known blood/bleeding disorders?  Yes, followed by [facility]  Does patient currently take blood thinners?   Coumadin - last taken on current  Other: xarelto  - last taken on current  Does patient currently take any antibiotics?   No  Patient educated and verbalized understanding.  Medical Decision Making:   Diagnosis:    Encounter Diagnoses   Name Primary?    Other chronic pain Yes    Chronic prescription opiate use         Impression: Patient last seen over 2 years ago on 9/7/2022.  At that time, had failed bilateral L5-S1 facet injections, and no additional injections were recommended.  Had been sent for updated MRI, though did not go at that time.  Had been sent to psych, with history of multiple suicide attempts, though did not go.    At some point thereafter, she had been seen by multiple other pain clinics.  No records available, but states that she had been followed by Dr. Vela who had done multiple procedures, to include radiofrequency ablation.  This did nothing for her.  She underwent ketamine treatments, to good initial improvement, though no sustained relief.  She eventually was sent for an MRI lumbar spine on 10/29/2023, which continued to be benign.   She has been diagnosed with fibromyalgia, and at some point was placed back on Norco, which she uses 10 mg 2/day, along with Lyrica 225 mg twice daily, Cymbalta 60 mg and Neurontin 400 mg 3 times daily.  She was diagnosed with thyroid cancer, and underwent surgery in July 2024.  After this, had been given Norco briefly by her surgeon, but was told to obtain it from her primary care physician thereafter.  She was told to find a pain clinic to provide it, and is here today to request refills.    Informed her that we do not provide opiates for fibromyalgia or for low back pain in the setting of benign imaging.  We would be happy  to review her previous pain clinic notes, to see if there is been any gaps in her treatment, though with the minimal amount of Norco she is taking, but either defer to her primary care physician or have her simply wean it.    Plan: Patient to follow up PRN.  No further injections recommended at this time, though would be happy to review her previous pain clinic records to assess for any gaps in her treatment.  Informed her that we will not be taking over her Norco refills for her fibromyalgia and low back pain in the setting of benign lumbar MRI.  She can discuss refills versus weaning with her primary care physician, or her previous prescribing physician.    No orders of the defined types were placed in this encounter.      Meds & Refills for this Visit:  Requested Prescriptions      No prescriptions requested or ordered in this encounter       Imaging & Consults:  None    The patient indicates understanding of these issues and agrees to the plan.    FARHANA Leon

## 2024-10-17 NOTE — PATIENT INSTRUCTIONS
Refill policies:    Allow 2-3 business days for refills; controlled substances may take longer.  Contact your pharmacy at least 5 days prior to running out of medication and have them send an electronic request or submit request through the “request refill” option in your [employer] account.  Refills are not addressed on weekends; covering physicians do not authorize routine medications on weekends.  No narcotics or controlled substances are refilled after noon on Fridays or by on call physicians.  By law, narcotics must be electronically prescribed.  A 30 day supply with no refills is the maximum allowed.  If your prescription is due for a refill, you may be due for a follow up appointment.  To best provide you care, patients receiving routine medications need to be seen at least once a year.  Patients receiving narcotic/controlled substance medications need to be seen at least once every 3 months.  In the event that your preferred pharmacy does not have the requested medication in stock (e.g. Backordered), it is your responsibility to find another pharmacy that has the requested medication available.  We will gladly send a new prescription to that pharmacy at your request.    Scheduling Tests:    If your physician has ordered radiology tests such as MRI or CT scans, please contact Central Scheduling at [phone] right away to schedule the test.  Once scheduled, the [facility] Centralized Referral Team will work with your insurance carrier to obtain pre-certification or prior authorization.  Depending on your insurance carrier, approval may take 3-10 days.  It is highly recommended patients assure they have received an authorization before having a test performed.  If test is done without insurance authorization, patient may be responsible for the entire amount billed.      Precertification and Prior Authorizations:  If your physician has recommended that you have a procedure or additional testing performed the [facility]  Centralized Referral Team will contact your insurance carrier to obtain pre-certification or prior authorization.    You are strongly encouraged to contact your insurance carrier to verify that your procedure/test has been approved and is a COVERED benefit.  Although the [facility] Centralized Referral Team does its due diligence, the insurance carrier gives the disclaimer that \"Although the procedure is authorized, this does not guarantee payment.\"    Ultimately the patient is responsible for payment.   Thank you for your understanding in this matter.  Paperwork Completion:  If you require FMLA or disability paperwork for your recovery, please make sure to either drop it off or have it faxed to our office at [phone]. Be sure the form has your name and date of birth on it.  The form will be faxed to our Forms Department and they will complete it for you.  There is a 25$ fee for all forms that need to be filled out.  Please be aware there is a 10-14 day turnaround time.  You will need to sign a release of information (FERNANDA) form if your paperwork does not come with one.  You may call the Forms Department with any questions at [phone].  Their fax number is [phone].

## 2024-10-18 ENCOUNTER — MED REC SCAN ONLY (OUTPATIENT)
Dept: PAIN CLINIC | Facility: CLINIC | Age: 39
End: 2024-10-18

## 2024-10-18 ENCOUNTER — TELEPHONE (OUTPATIENT)
Dept: PAIN CLINIC | Facility: CLINIC | Age: 39
End: 2024-10-18

## 2024-10-18 NOTE — TELEPHONE ENCOUNTER
Faxed FERNANDA to [facility]. Confirmation received.   Original sent to scanning.

## 2024-10-21 NOTE — TELEPHONE ENCOUNTER
Received patient's [facility] records.  Placed in RN bin for provider review.

## (undated) DIAGNOSIS — I10 HTN (HYPERTENSION): ICD-10-CM

## (undated) DIAGNOSIS — E78.5 HYPERLIPIDEMIA, UNSPECIFIED HYPERLIPIDEMIA TYPE: ICD-10-CM

## (undated) DIAGNOSIS — M79.672 BILATERAL FOOT PAIN: Primary | ICD-10-CM

## (undated) DIAGNOSIS — M79.671 BILATERAL FOOT PAIN: Primary | ICD-10-CM

## (undated) DIAGNOSIS — R01.1 HEART MURMUR: Primary | ICD-10-CM

## (undated) DEVICE — REMOVER PREP SOLUTION 4OZ

## (undated) DEVICE — PAIN TRAY: Brand: MEDLINE INDUSTRIES, INC.

## (undated) DEVICE — NEEDLE SPINAL 22X7 405149

## (undated) DEVICE — GLOVE SURG SENSICARE SZ 7

## (undated) DEVICE — SKIN MARKER DUAL TIP WITH RULER CAP AND LABELS: Brand: DEVON

## (undated) DEVICE — GLOVE SURG SENSICARE SZ 7-1/2

## (undated) NOTE — LETTER
22    RE: [facility] Serum    : 10/9/1985    Dear Dr. Lynne Lopez,    Your patient is being scheduled for a pain management procedure at [facility].    Procedure:  Bilateral Facet Injections L5/S1  Date of Procedure: TBD  Physician: Yvonne Hardy- Anesthesiologist     Your patient is currently taking Eliquis.  usually recommends the medication be held for 3 days prior to injection. Please verify patient is cleared to proceed with pain management procedures. Clearance Approved   ____________    Clearance Denied       ____________      Comments: ______________________________________________________    Signature: ________________________________  Date: _________________       If you have any questions please feel free to contact our office at [phone], option # 2. Please fax this clearance request to our office at fax # [phone] or send electronically.        Thank you,    [employer]

## (undated) NOTE — LETTER
22  RE: Zeyad Vanegas    : 10/9/1985    Dear Dr. Roxy Ryan    Your patient is being scheduled for a pain management procedure at [facility] within the next 4 weeks. Procedure: Bilateral SI joint injections  Physician: Dr. LIONEL DAVISON- Anesthesiologist     Your patient is currently taking Eliquis. Dr. LIONEL DAVISON usually recommends the medication be held for 3 days prior to injection. Please verify patient is cleared to proceed with pain management procedures. Clearance Approved   ____________    Clearance Denied       ____________      Comments: ______________________________________________________    Signature: ________________________________  Date: _________________       If you have any questions please feel free to contact our office at [phone], option # 2. Please fax this clearance request to our office at fax # [phone] or send electronically.        Thank you,      [place] [employer]

## (undated) NOTE — LETTER
22  RE: [place]chn[place] Decree    : 10/9/1985    Dear Dr. Olivia Raman    Your patient is being scheduled for a pain management procedure at [facility].    Procedure:  Bilateral L5-S1 facet injection   Date of procedure: 22  Physician: Orlando Camargo- Anesthesiologist     Your patient is currently taking Eliquis. Orlando Camargo usually recommends the medication be held for 3 days prior to injection. Please verify patient is cleared to proceed with pain management procedures. Clearance Approved   ____________    Clearance Denied       ____________      Comments: ______________________________________________________    Signature: ________________________________  Date: _________________       If you have any questions please feel free to contact our office at [phone], option # 2. Please fax this clearance request to our office at fax # [phone] or send electronically.        Thank you,      [employer]